# Patient Record
Sex: FEMALE | HISPANIC OR LATINO | URBAN - METROPOLITAN AREA
[De-identification: names, ages, dates, MRNs, and addresses within clinical notes are randomized per-mention and may not be internally consistent; named-entity substitution may affect disease eponyms.]

---

## 2019-01-14 ENCOUNTER — OFFICE VISIT (OUTPATIENT)
Dept: URGENT CARE | Facility: MEDICAL CENTER | Age: 34
End: 2019-01-14
Payer: COMMERCIAL

## 2019-01-14 ENCOUNTER — HOSPITAL ENCOUNTER (OUTPATIENT)
Facility: MEDICAL CENTER | Age: 34
End: 2019-01-14
Attending: PHYSICIAN ASSISTANT
Payer: COMMERCIAL

## 2019-01-14 VITALS
SYSTOLIC BLOOD PRESSURE: 118 MMHG | TEMPERATURE: 98 F | DIASTOLIC BLOOD PRESSURE: 70 MMHG | HEART RATE: 79 BPM | BODY MASS INDEX: 29.52 KG/M2 | OXYGEN SATURATION: 98 % | WEIGHT: 172 LBS

## 2019-01-14 DIAGNOSIS — R30.0 DYSURIA: ICD-10-CM

## 2019-01-14 DIAGNOSIS — N76.0 ACUTE VAGINITIS: ICD-10-CM

## 2019-01-14 DIAGNOSIS — N30.00 ACUTE CYSTITIS WITHOUT HEMATURIA: ICD-10-CM

## 2019-01-14 LAB
APPEARANCE UR: NORMAL
BILIRUB UR STRIP-MCNC: NORMAL MG/DL
COLOR UR AUTO: YELLOW
GLUCOSE UR STRIP.AUTO-MCNC: NEGATIVE MG/DL
KETONES UR STRIP.AUTO-MCNC: NORMAL MG/DL
LEUKOCYTE ESTERASE UR QL STRIP.AUTO: NORMAL
NITRITE UR QL STRIP.AUTO: NEGATIVE
PH UR STRIP.AUTO: 6 [PH] (ref 5–8)
PROT UR QL STRIP: NORMAL MG/DL
RBC UR QL AUTO: NEGATIVE
SP GR UR STRIP.AUTO: >=1.03
UROBILINOGEN UR STRIP-MCNC: NORMAL MG/DL

## 2019-01-14 PROCEDURE — 87660 TRICHOMONAS VAGIN DIR PROBE: CPT

## 2019-01-14 PROCEDURE — 99214 OFFICE O/P EST MOD 30 MIN: CPT | Performed by: PHYSICIAN ASSISTANT

## 2019-01-14 PROCEDURE — 87510 GARDNER VAG DNA DIR PROBE: CPT

## 2019-01-14 PROCEDURE — 87480 CANDIDA DNA DIR PROBE: CPT

## 2019-01-14 PROCEDURE — 81002 URINALYSIS NONAUTO W/O SCOPE: CPT | Performed by: PHYSICIAN ASSISTANT

## 2019-01-14 PROCEDURE — 87086 URINE CULTURE/COLONY COUNT: CPT

## 2019-01-14 RX ORDER — SULFAMETHOXAZOLE AND TRIMETHOPRIM 800; 160 MG/1; MG/1
1 TABLET ORAL EVERY 12 HOURS
Qty: 6 TAB | Refills: 0 | Status: SHIPPED | OUTPATIENT
Start: 2019-01-14 | End: 2019-01-17

## 2019-01-14 RX ORDER — FLUCONAZOLE 150 MG/1
150 TABLET ORAL DAILY
Qty: 1 TAB | Refills: 0 | Status: SHIPPED | OUTPATIENT
Start: 2019-01-14 | End: 2019-01-15

## 2019-01-14 ASSESSMENT — ENCOUNTER SYMPTOMS
PALPITATIONS: 0
FEVER: 0
CONSTIPATION: 0
SHORTNESS OF BREATH: 0
ABDOMINAL PAIN: 0
SORE THROAT: 0
NAUSEA: 0
FLANK PAIN: 0
DIARRHEA: 0
DIZZINESS: 0
CHILLS: 0
BLURRED VISION: 0
VAGINITIS: 1

## 2019-01-14 NOTE — PROGRESS NOTES
Subjective:      Josie Saldana is a 33 y.o. female who presents with Other (yeast infection, 5 days)      Vaginitis   The patient's primary symptoms include genital itching. This is a new problem. The current episode started in the past 7 days (Started about 5 days ago). The problem occurs constantly. The problem has been gradually worsening. The patient is experiencing no pain. The problem affects both sides. She is not pregnant. Associated symptoms include dysuria, frequency and painful intercourse. Pertinent negatives include no abdominal pain, chills, constipation, diarrhea, discolored urine, fever, flank pain, nausea, sore throat or urgency. The symptoms are aggravated by intercourse and urinating. She has tried nothing for the symptoms. She is sexually active. No, her partner does not have an STD. She uses tubal ligation for contraception.       Review of Systems   Constitutional: Negative for chills, fever and malaise/fatigue.   HENT: Negative for sore throat.    Eyes: Negative for blurred vision.   Respiratory: Negative for shortness of breath.    Cardiovascular: Negative for chest pain and palpitations.   Gastrointestinal: Negative for abdominal pain, constipation, diarrhea and nausea.   Genitourinary: Positive for dysuria and frequency. Negative for flank pain and urgency.        Vaginal itching   Neurological: Negative for dizziness.       PMH:  has no past medical history on file.  MEDS:   Current Outpatient Prescriptions:   •  CRANBERRY PO, Take  by mouth., Disp: , Rfl:   •  Ascorbic Acid (ROSA-C PO), Take  by mouth., Disp: , Rfl:   •  sulfamethoxazole-trimethoprim (BACTRIM DS) 800-160 MG tablet, Take 1 Tab by mouth every 12 hours for 3 days., Disp: 6 Tab, Rfl: 0  •  fluconazole (DIFLUCAN) 150 MG tablet, Take 1 Tab by mouth every day for 1 dose., Disp: 1 Tab, Rfl: 0  ALLERGIES: No Known Allergies  SURGHX:   Past Surgical History:   Procedure Laterality Date   • TUBAL COAGULATION LAPAROSCOPIC BILATERAL   3/21/2011    Performed by CAPURRO, CORINNE E at SURGERY SAME DAY [facility]     SOCHX:  reports that she has never smoked. She has never used smokeless tobacco. She reports that she does not drink alcohol or use drugs.  FH: Family history was reviewed, no pertinent findings to report     Objective:     /70   Pulse 79   Temp 36.7 °C (98 °F)   Wt 78 kg (172 lb)   SpO2 98%   BMI 29.52 kg/m²      Physical Exam   Constitutional: She is oriented to person, place, and time. She appears well-developed and well-nourished.   HENT:   Head: Normocephalic and atraumatic.   Right Ear: External ear normal.   Left Ear: External ear normal.   Eyes: Pupils are equal, round, and reactive to light. Conjunctivae are normal.   Cardiovascular: Normal rate and regular rhythm.    No murmur heard.  Pulmonary/Chest: Effort normal and breath sounds normal.   Abdominal: Soft. Normal appearance. There is no tenderness. There is no CVA tenderness.   Genitourinary: Pelvic exam was performed with patient supine. There is no rash, tenderness or lesion on the right labia. There is no rash, tenderness or lesion on the left labia. No erythema, tenderness or bleeding in the vagina. No foreign body in the vagina. No signs of injury around the vagina. Vaginal discharge (Clear/white) found.   Neurological: She is alert and oriented to person, place, and time.   Skin: Skin is warm and dry. Capillary refill takes less than 2 seconds.   Psychiatric: She has a normal mood and affect. Her behavior is normal. Judgment normal.       POCT Urinalysis  Lab Results   Component Value Date/Time    POCCOLOR Yellow 01/14/2019 01:52 PM    POCAPPEAR cloudy 01/14/2019 01:52 PM    POCLEUKEST moderate 01/14/2019 01:52 PM    POCNITRITE negative 01/14/2019 01:52 PM    POCUROBILIGE 0.2 E.U./dL 01/14/2019 01:52 PM    POCPROTEIN trace 01/14/2019 01:52 PM    POCURPH 6.0 01/14/2019 01:52 PM    POCBLOOD negative 01/14/2019 01:52 PM    POCSPGRV >=1.030 01/14/2019 01:52 PM     POCKETONES trace 01/14/2019 01:52 PM    POCBILIRUBIN small 01/14/2019 01:52 PM    POCGLUCUA negative 01/14/2019 01:52 PM         Assessment/Plan:     1. Acute vaginitis  - VAGINAL PATHOGENS DNA PANEL; Future  - fluconazole (DIFLUCAN) 150 MG tablet; Take 1 Tab by mouth every day for 1 dose.  Dispense: 1 Tab; Refill: 0    2. Acute cystitis without hematuria  - Urine Culture [[id number]]; Future  - sulfamethoxazole-trimethoprim (BACTRIM DS) 800-160 MG tablet; Take 1 Tab by mouth every 12 hours for 3 days.  Dispense: 6 Tab; Refill: 0    3. Dysuria  - POCT Urinalysis  - VAGINAL PATHOGENS DNA PANEL; Future        Differential Diagnosis, natural history, and supportive care discussed. Return to the Urgent Care or follow up with your PCP if symptoms fail to resolve, or for any new or worsening symptoms. Emergency room precautions discussed. Patient and/or family appears understanding of information.

## 2019-01-15 LAB
CANDIDA DNA VAG QL PROBE+SIG AMP: POSITIVE
G VAGINALIS DNA VAG QL PROBE+SIG AMP: POSITIVE
T VAGINALIS DNA VAG QL PROBE+SIG AMP: NEGATIVE

## 2019-01-17 ENCOUNTER — TELEPHONE (OUTPATIENT)
Dept: URGENT CARE | Facility: MEDICAL CENTER | Age: 34
End: 2019-01-17

## 2019-01-17 DIAGNOSIS — B96.89 BV (BACTERIAL VAGINOSIS): ICD-10-CM

## 2019-01-17 DIAGNOSIS — N76.0 BV (BACTERIAL VAGINOSIS): ICD-10-CM

## 2019-01-17 LAB
BACTERIA UR CULT: ABNORMAL
BACTERIA UR CULT: ABNORMAL
SIGNIFICANT IND 70042: ABNORMAL
SITE SITE: ABNORMAL
SOURCE SOURCE: ABNORMAL

## 2019-01-17 RX ORDER — METRONIDAZOLE 500 MG/1
500 TABLET ORAL 2 TIMES DAILY
Qty: 14 TAB | Refills: 0 | Status: SHIPPED | OUTPATIENT
Start: 2019-01-17 | End: 2019-01-24

## 2019-01-17 NOTE — TELEPHONE ENCOUNTER
Called patient to discuss her positive lab results. She has already been treated for yeast with diflucan, advised her that I will send metronidazole over to the pharmacy and instructed her not to drink alcohol while she is on this medication.

## 2021-05-05 ENCOUNTER — OFFICE VISIT (OUTPATIENT)
Dept: URGENT CARE | Facility: CLINIC | Age: 36
End: 2021-05-05
Payer: COMMERCIAL

## 2021-05-05 VITALS
BODY MASS INDEX: 28.85 KG/M2 | RESPIRATION RATE: 12 BRPM | TEMPERATURE: 98.1 F | HEIGHT: 64 IN | DIASTOLIC BLOOD PRESSURE: 70 MMHG | SYSTOLIC BLOOD PRESSURE: 104 MMHG | OXYGEN SATURATION: 99 % | HEART RATE: 64 BPM | WEIGHT: 169 LBS

## 2021-05-05 DIAGNOSIS — L03.011 PARONYCHIA OF FINGER OF RIGHT HAND: Primary | ICD-10-CM

## 2021-05-05 PROCEDURE — 99213 OFFICE O/P EST LOW 20 MIN: CPT | Performed by: PHYSICIAN ASSISTANT

## 2021-05-05 RX ORDER — AMOXICILLIN 500 MG/1
500 CAPSULE ORAL 3 TIMES DAILY
Qty: 15 CAPSULE | Refills: 0 | Status: SHIPPED | OUTPATIENT
Start: 2021-05-05 | End: 2021-05-10

## 2021-05-05 NOTE — PROGRESS NOTES
"Subjective:   Josie Saldana is a 35 y.o. female who presents for Finger Pain (/swelling RIGHT 3rd finger, x1 week )        HPI   The pt present to  to redness, swelling and pain to the R 3rd finger. The sx have been present for 1 week. They seem to be improving initially but now worsened. There has been no discharge or drainage. She has not tried to treat her symptoms. This is the first episode of its kind. She does note a hangnail she removed prior to symptoms starting. No fever or chills. No difficulty moving finger.    ROS    PMH:  has no past medical history on file.  MEDS:   Current Outpatient Medications:   •  amoxicillin (AMOXIL) 500 MG Cap, Take 1 capsule by mouth 3 times a day for 5 days., Disp: 15 capsule, Rfl: 0  •  CRANBERRY PO, Take  by mouth., Disp: , Rfl:   •  Ascorbic Acid (ROSA-C PO), Take  by mouth., Disp: , Rfl:   ALLERGIES: No Known Allergies  SURGHX:   Past Surgical History:   Procedure Laterality Date   • TUBAL COAGULATION LAPAROSCOPIC BILATERAL  3/21/2011    Performed by CAPURRO, CORINNE E at SURGERY SAME DAY [facility]     SOCHX:  reports that she has never smoked. She has never used smokeless tobacco. She reports that she does not drink alcohol and does not use drugs.  History reviewed. No pertinent family history.     Objective:   /70   Pulse 64   Temp 36.7 °C (98.1 °F) (Temporal)   Resp 12   Ht 1.626 m (5' 4\")   Wt 76.7 kg (169 lb)   LMP 04/21/2021 (Exact Date)   SpO2 99%   Breastfeeding No   BMI 29.01 kg/m²     Physical Exam  Vitals reviewed.   Constitutional:       General: She is not in acute distress.     Appearance: She is well-developed.   HENT:      Head: Normocephalic and atraumatic.      Right Ear: External ear normal.      Left Ear: External ear normal.      Nose: Nose normal.      Mouth/Throat:      Mouth: Mucous membranes are moist.   Eyes:      Conjunctiva/sclera: Conjunctivae normal.      Pupils: Pupils are equal, round, and reactive to light.   Neck:      " Trachea: No tracheal deviation.   Cardiovascular:      Rate and Rhythm: Normal rate and regular rhythm.   Pulmonary:      Effort: Pulmonary effort is normal.      Breath sounds: Normal breath sounds.   Musculoskeletal:        Arms:       Cervical back: Normal range of motion and neck supple.   Skin:     General: Skin is warm and dry.      Capillary Refill: Capillary refill takes less than 2 seconds.   Neurological:      General: No focal deficit present.      Mental Status: She is alert and oriented to person, place, and time.   Psychiatric:         Mood and Affect: Mood normal.         Behavior: Behavior normal.           Assessment/Plan:     1. Paronychia of finger of right hand  amoxicillin (AMOXIL) 500 MG Cap     Area is nonfluctuant without discharge, she will start amoxicillin 500 mg 3 times daily x5 days. Start Epson salt soak, tylenol/ibuprofen for pain relief. Paronychia handout provided.     Follow-up with primary care provider within 7-10 days.  If symptoms worsen or persist patient can return to clinic for reevaluation. Side effects of medication discussed. Patient confirmed understanding of information.    Please note that this dictation was created using voice recognition software. I have made every reasonable attempt to correct obvious errors, but I expect that there are errors of grammar and possibly content that I did not discover before finalizing the note.